# Patient Record
Sex: FEMALE | Race: BLACK OR AFRICAN AMERICAN | NOT HISPANIC OR LATINO | Employment: STUDENT | URBAN - METROPOLITAN AREA
[De-identification: names, ages, dates, MRNs, and addresses within clinical notes are randomized per-mention and may not be internally consistent; named-entity substitution may affect disease eponyms.]

---

## 2018-11-04 ENCOUNTER — OFFICE VISIT (OUTPATIENT)
Dept: URGENT CARE | Facility: CLINIC | Age: 18
End: 2018-11-04
Payer: COMMERCIAL

## 2018-11-04 VITALS
HEIGHT: 62 IN | BODY MASS INDEX: 25.4 KG/M2 | OXYGEN SATURATION: 98 % | TEMPERATURE: 98.2 F | SYSTOLIC BLOOD PRESSURE: 126 MMHG | RESPIRATION RATE: 16 BRPM | DIASTOLIC BLOOD PRESSURE: 76 MMHG | WEIGHT: 138 LBS | HEART RATE: 80 BPM

## 2018-11-04 DIAGNOSIS — H10.33 ACUTE BACTERIAL CONJUNCTIVITIS OF BOTH EYES: ICD-10-CM

## 2018-11-04 DIAGNOSIS — J01.90 ACUTE SINUSITIS, RECURRENCE NOT SPECIFIED, UNSPECIFIED LOCATION: Primary | ICD-10-CM

## 2018-11-04 PROCEDURE — 99203 OFFICE O/P NEW LOW 30 MIN: CPT | Performed by: PHYSICIAN ASSISTANT

## 2018-11-04 RX ORDER — AMOXICILLIN AND CLAVULANATE POTASSIUM 875; 125 MG/1; MG/1
1 TABLET, FILM COATED ORAL EVERY 12 HOURS SCHEDULED
Qty: 14 TABLET | Refills: 0 | Status: SHIPPED | OUTPATIENT
Start: 2018-11-04 | End: 2018-11-11

## 2018-11-04 RX ORDER — TOBRAMYCIN 3 MG/ML
2 SOLUTION/ DROPS OPHTHALMIC
Qty: 1 BOTTLE | Refills: 0 | Status: SHIPPED | OUTPATIENT
Start: 2018-11-04 | End: 2018-11-11

## 2018-11-04 RX ORDER — FLUTICASONE PROPIONATE 110 UG/1
2 AEROSOL, METERED RESPIRATORY (INHALATION) 2 TIMES DAILY
COMMUNITY

## 2018-11-04 RX ORDER — MONTELUKAST SODIUM 10 MG/1
10 TABLET ORAL
COMMUNITY

## 2018-11-04 NOTE — PROGRESS NOTES
[employer]        NAME: Natasha Naranjo is a 21 y o  female  : 2000    MRN: [id number]  DATE: 2018  TIME: 2:38 PM    Assessment and Plan   Acute sinusitis, recurrence not specified, unspecified location [J01 90]  1  Acute sinusitis, recurrence not specified, unspecified location  amoxicillin-clavulanate (AUGMENTIN) 875-125 mg per tablet   2  Acute bacterial conjunctivitis of both eyes  tobramycin (TOBREX) 0 3 % SOLN         Patient Instructions       Follow up with PCP in 3-5 days  Proceed to  ER if symptoms worsen  Chief Complaint     Chief Complaint   Patient presents with    Sore Throat     Fever on wednesday, swollen throat, hard to swollow, bad headache, chills, sweats,  b/l ears congested  Yesterday nasal congested   Eye Problem     Woke up this AM with b/l eye eyrthema, "gunk" crusting her eyes shut  History of Present Illness       Patient is here for evaluation of sinus congestion, pressure, headache, fever and achiness  Patient's symptoms started on Wednesday and have been getting progressively worse  Patient woke up this morning with bilateral redness and drainage from both eyes    She denies any excess tearing, double vision, blurred vision        Review of Systems   Review of Systems      Current Medications       Current Outpatient Prescriptions:     fexofenadine (ALLEGRA) 30 MG tablet, Take 30 mg by mouth 2 (two) times a day, Disp: , Rfl:     fluticasone (FLOVENT HFA) 110 MCG/ACT inhaler, Inhale 2 puffs 2 (two) times a day Rinse mouth after use , Disp: , Rfl:     montelukast (SINGULAIR) 10 mg tablet, Take 10 mg by mouth daily at bedtime, Disp: , Rfl:     amoxicillin-clavulanate (AUGMENTIN) 875-125 mg per tablet, Take 1 tablet by mouth every 12 (twelve) hours for 7 days, Disp: 14 tablet, Rfl: 0    tobramycin (TOBREX) 0 3 % SOLN, Administer 2 drops to both eyes every 4 (four) hours while awake for 7 days, Disp: 1 Bottle, Rfl: 0    Current Allergies     Allergies as of 11/04/2018    (No Known Allergies)            The following portions of the patient's history were reviewed and updated as appropriate: allergies, current medications, past family history, past medical history, past social history, past surgical history and problem list      No past medical history on file  No past surgical history on file  No family history on file  Medications have been verified  Objective   /76   Pulse 80   Temp 98 2 °F (36 8 °C)   Resp 16   Ht 5' 2" (1 575 m)   Wt 62 6 kg (138 lb)   SpO2 98%   BMI 25 24 kg/m²        Physical Exam     Physical Exam   Constitutional: She is oriented to person, place, and time  She appears well-developed and well-nourished  No distress  HENT:   Head: Normocephalic and atraumatic  Right Ear: External ear normal    Left Ear: External ear normal    TMs intact bilaterally with clear fluid in the middle ear  No erythema  Bilateral nasal congestion and erythema with mucopurulent drainage  Mild bilateral tonsillar erythema with no soft tissue swelling  No exudate  Eyes: Pupils are equal, round, and reactive to light  EOM are normal  Right eye exhibits discharge  Left eye exhibits no discharge  Bilateral conjunctivitis  Pulmonary/Chest: Effort normal and breath sounds normal  No respiratory distress  She has no wheezes  She has no rales  Lymphadenopathy:     She has cervical adenopathy  Neurological: She is alert and oriented to person, place, and time  Skin: Skin is warm and dry  Psychiatric: She has a normal mood and affect  Her behavior is normal    Nursing note and vitals reviewed

## 2019-08-13 ENCOUNTER — OFFICE VISIT (OUTPATIENT)
Dept: PEDIATRICS CLINIC | Facility: CLINIC | Age: 19
End: 2019-08-13
Payer: COMMERCIAL

## 2019-08-13 VITALS
HEART RATE: 80 BPM | HEIGHT: 62 IN | BODY MASS INDEX: 26.31 KG/M2 | WEIGHT: 143 LBS | SYSTOLIC BLOOD PRESSURE: 120 MMHG | DIASTOLIC BLOOD PRESSURE: 78 MMHG

## 2019-08-13 DIAGNOSIS — Z71.3 NUTRITIONAL COUNSELING: ICD-10-CM

## 2019-08-13 DIAGNOSIS — Z13.31 SCREENING FOR DEPRESSION: ICD-10-CM

## 2019-08-13 DIAGNOSIS — Z00.129 ENCOUNTER FOR ROUTINE CHILD HEALTH EXAMINATION WITHOUT ABNORMAL FINDINGS: Primary | ICD-10-CM

## 2019-08-13 DIAGNOSIS — Z71.82 EXERCISE COUNSELING: ICD-10-CM

## 2019-08-13 PROCEDURE — 99395 PREV VISIT EST AGE 18-39: CPT | Performed by: PEDIATRICS

## 2019-08-13 PROCEDURE — 1036F TOBACCO NON-USER: CPT | Performed by: PEDIATRICS

## 2019-08-13 PROCEDURE — 96127 BRIEF EMOTIONAL/BEHAV ASSMT: CPT | Performed by: PEDIATRICS

## 2019-08-13 PROCEDURE — 3008F BODY MASS INDEX DOCD: CPT | Performed by: PEDIATRICS

## 2019-08-13 NOTE — PROGRESS NOTES
Subjective:     Ye Keller is a 19 y o  female who is brought in for this well child visit  History provided by: patient    Current Issues:  Current concerns: none  regular periods, no issues    The following portions of the patient's history were reviewed and updated as appropriate: allergies, current medications, past family history, past medical history, past social history, past surgical history and problem list     Well Child Assessment:  History provided by: patient  Keshawn Mckeon lives with her mother, father, sister and brother (boyfriend in basement)  Nutrition  Types of intake include vegetables, fruits, meats, eggs and cow's milk (drinks water well, dances 4-6 hours daily)  Dental  The patient has a dental home  The patient brushes teeth regularly  The patient flosses regularly  Last dental exam was 6-12 months ago  Behavioral  Disciplinary methods include consistency among caregivers  Sleep  Average sleep duration is 8 hours  The patient does not snore  There are no sleep problems  Safety  There is no smoking in the home  Home has working smoke alarms? yes  Home has working carbon monoxide alarms? yes  There is no gun in home  School  Current school district is [place] for [employer]  There are no signs of learning disabilities  Child is doing well in school  Screening  There are no risk factors for hearing loss  There are no risk factors for anemia  There are no risk factors for dyslipidemia  There are no risk factors for tuberculosis  There are no risk factors for vision problems  There are no risk factors related to diet  There are no risk factors at school  There are no risk factors for sexually transmitted infections  There are risk factors related to alcohol (drinks with family)  There are no risk factors related to relationships  There are no risk factors related to friends or family  There are no risk factors related to emotions  There are no risk factors related to drugs   There are no risk factors related to personal safety  There are no risk factors related to tobacco  There are no risk factors related to special circumstances  Social  The caregiver enjoys the child  Sibling interactions are good  Objective:       Vitals:    08/13/19 1006   BP: 120/78   Pulse: 80   Weight: 64 9 kg (143 lb)   Height: 5' 1 5" (1 562 m)     Growth parameters are noted and are appropriate for age  Wt Readings from Last 1 Encounters:   08/13/19 64 9 kg (143 lb)     Ht Readings from Last 1 Encounters:   08/13/19 5' 1 5" (1 562 m)      Body mass index is 26 58 kg/m²  Vitals:    08/13/19 1006   BP: 120/78   Pulse: 80   Weight: 64 9 kg (143 lb)   Height: 5' 1 5" (1 562 m)       No exam data present    Physical Exam   Constitutional: She is oriented to person, place, and time  She appears well-developed and well-nourished  She is cooperative  HENT:   Head: Normocephalic  Right Ear: Hearing, tympanic membrane, external ear and ear canal normal    Left Ear: Hearing, tympanic membrane, external ear and ear canal normal    Nose: Nose normal    Mouth/Throat: Oropharynx is clear and moist and mucous membranes are normal    Eyes: Pupils are equal, round, and reactive to light  Conjunctivae, EOM and lids are normal    Neck: Trachea normal and normal range of motion  Neck supple  Cardiovascular: Normal rate, regular rhythm, normal heart sounds and normal pulses  No murmur heard  Pulmonary/Chest: Effort normal and breath sounds normal  She has no wheezes  She has no rhonchi  She has no rales  Abdominal: Soft  Bowel sounds are normal  She exhibits no mass  There is no hepatosplenomegaly  There is no tenderness  Genitourinary: Vagina normal    Genitourinary Comments: Normal Female  exam, tirso stage 5   Musculoskeletal: Normal range of motion  Neurological: She is alert and oriented to person, place, and time  She has normal strength  Skin: Skin is warm and dry   Capillary refill takes less than 2 seconds  No rash noted  Psychiatric: She has a normal mood and affect  Her behavior is normal  Judgment and thought content normal    Nursing note and vitals reviewed  Assessment:     Well adolescent  1  Screening for depression          Plan:         1  Anticipatory guidance discussed  Specific topics reviewed: breast self-exam, drugs, ETOH, and tobacco, minimize junk food, seat belts and sex; STD and pregnancy prevention  Nutrition and Exercise Counseling: The patient's Body mass index is 26 58 kg/m²  This is Facility age limit for growth percentiles is 20 years  Nutrition counseling provided:  Anticipatory guidance for nutrition given and counseled on healthy eating habits, 5 servings of fruits/vegetables and Avoid juice/sugary drinks    Exercise counseling provided:  Anticipatory guidance and counseling on exercise and physical activity given, Reduce screen time to less than 2 hours per day and 1 hour of aerobic exercise daily      2  Depression screen performed:       Patient screened- Negative    3  Development: appropriate for age    3  Immunizations today: Immunizations are up to date    11  Follow-up visit in 1 year for next well child visit, or sooner as needed

## 2019-08-13 NOTE — PATIENT INSTRUCTIONS

## 2020-07-14 ENCOUNTER — OFFICE VISIT (OUTPATIENT)
Dept: URGENT CARE | Facility: CLINIC | Age: 20
End: 2020-07-14
Payer: COMMERCIAL

## 2020-07-14 VITALS
TEMPERATURE: 96.2 F | HEIGHT: 63 IN | BODY MASS INDEX: 27 KG/M2 | OXYGEN SATURATION: 98 % | RESPIRATION RATE: 16 BRPM | HEART RATE: 95 BPM | SYSTOLIC BLOOD PRESSURE: 116 MMHG | WEIGHT: 152.4 LBS | DIASTOLIC BLOOD PRESSURE: 80 MMHG

## 2020-07-14 DIAGNOSIS — Z02.4 DRIVER'S PERMIT PE (PHYSICAL EXAMINATION): Primary | ICD-10-CM

## 2020-07-14 NOTE — PROGRESS NOTES
NAME: Pricila Robbins is a 22 y o  female  : 2000    MRN: [id number]      Assessment and Plan   's permit PE (physical examination) [Z02 4]  1  's permit PE (physical examination)     Patient is found to be mentally sound  Normal exam   's PE form signed and scanned  If any medical conditions presents follow-up with PCP  Patient voice understanding  Elizabeth Sanchez was seen today for annual exam     Diagnoses and all orders for this visit:    's permit PE (physical examination)        Patient Instructions   There are no Patient Instructions on file for this visit  Proceed to ER if symptoms worsen  Chief Complaint     Chief Complaint   Patient presents with    Annual Exam     drivers license         History of Present Illness     20yo Pt present for 's permit physical   Patient denies any medical history  Patient denies neurological disorders, homicidal thoughts, suicidal thoughts  Patient denies seizures, cardiac disorders, lung disease  Patient denies any amputations  Patient denies any history of drug abuse, alcohol abuse  Review of Systems   Review of Systems   Constitutional: Negative for chills, fatigue and fever  HENT: Negative for congestion, ear pain, postnasal drip, rhinorrhea, sinus pressure and sore throat  Respiratory: Negative for cough, chest tightness, shortness of breath and wheezing  Cardiovascular: Negative for chest pain and palpitations  Gastrointestinal: Negative for abdominal pain, constipation, diarrhea, nausea and vomiting  Musculoskeletal: Negative for back pain and neck pain  Skin: Negative  Neurological: Negative for dizziness, weakness, numbness and headaches  Hematological: Negative  Psychiatric/Behavioral: Negative            Current Medications       Current Outpatient Medications:     fexofenadine (ALLEGRA) 30 MG tablet, Take 30 mg by mouth 2 (two) times a day, Disp: , Rfl:     fluticasone (FLOVENT HFA) 110 MCG/ACT inhaler, Inhale 2 puffs 2 (two) times a day Rinse mouth after use , Disp: , Rfl:     montelukast (SINGULAIR) 10 mg tablet, Take 10 mg by mouth daily at bedtime, Disp: , Rfl:     Current Allergies     Allergies as of 07/14/2020    (No Known Allergies)              Past Medical History:   Diagnosis Date    Allergic     Asthma        History reviewed  No pertinent surgical history  No family history on file  Medications have been verified  The following portions of the patient's history were reviewed and updated as appropriate: allergies, current medications, past family history, past medical history, past social history, past surgical history and problem list     Objective   /80   Pulse 95   Temp (!) 96 2 °F (35 7 °C) (Tympanic)   Resp 16   Ht 5' 2 5" (1 588 m)   Wt 69 1 kg (152 lb 6 4 oz)   SpO2 98%   BMI 27 43 kg/m²      Physical Exam     Physical Exam   Constitutional: She is oriented to person, place, and time  She appears well-developed and well-nourished  No distress  HENT:   Head: Normocephalic and atraumatic  Right Ear: Hearing, tympanic membrane, external ear and ear canal normal    Left Ear: Hearing, tympanic membrane, external ear and ear canal normal    Mouth/Throat: Uvula is midline, oropharynx is clear and moist and mucous membranes are normal  No tonsillar exudate  Eyes: Pupils are equal, round, and reactive to light  Conjunctivae and EOM are normal  Right eye exhibits no discharge  Left eye exhibits no discharge  No scleral icterus  Neck: Normal range of motion  Neck supple  No tracheal deviation present  No thyromegaly present  Cardiovascular: Normal rate, regular rhythm and normal heart sounds  Exam reveals no gallop and no friction rub  No murmur heard  Pulmonary/Chest: Effort normal and breath sounds normal  No stridor  She has no wheezes  She has no rales  Abdominal: Soft  She exhibits no distension and no mass  There is no tenderness   There is no rebound and no guarding  No hernia  Musculoskeletal: Normal range of motion  She exhibits no edema, tenderness or deformity  Neurological: She is alert and oriented to person, place, and time  She displays normal reflexes  No cranial nerve deficit or sensory deficit  She exhibits normal muscle tone  Coordination normal    Skin: Skin is warm and dry  No rash noted  She is not diaphoretic  No erythema  No pallor  Psychiatric: She has a normal mood and affect  Her behavior is normal    Nursing note and vitals reviewed        Sravani Winchester PA-C

## 2021-02-12 ENCOUNTER — HOSPITAL ENCOUNTER (EMERGENCY)
Facility: HOSPITAL | Age: 21
Discharge: HOME | End: 2021-02-12
Attending: EMERGENCY MEDICINE | Admitting: EMERGENCY MEDICINE
Payer: COMMERCIAL

## 2021-02-12 VITALS
RESPIRATION RATE: 20 BRPM | DIASTOLIC BLOOD PRESSURE: 72 MMHG | TEMPERATURE: 97.7 F | SYSTOLIC BLOOD PRESSURE: 120 MMHG | HEART RATE: 92 BPM | OXYGEN SATURATION: 99 %

## 2021-02-12 DIAGNOSIS — Z20.822 CONTACT WITH AND (SUSPECTED) EXPOSURE TO COVID-19: ICD-10-CM

## 2021-02-12 DIAGNOSIS — R06.02 SOB (SHORTNESS OF BREATH): Primary | ICD-10-CM

## 2021-02-12 PROCEDURE — 99284 EMERGENCY DEPT VISIT MOD MDM: CPT

## 2021-02-12 PROCEDURE — 87635 SARS-COV-2 COVID-19 AMP PRB: CPT | Performed by: EMERGENCY MEDICINE

## 2021-02-12 PROCEDURE — 99284 EMERGENCY DEPT VISIT MOD MDM: CPT | Performed by: EMERGENCY MEDICINE

## 2021-02-13 LAB — SARS-COV-2 RNA RESP QL NAA+PROBE: POSITIVE

## 2021-02-13 NOTE — ED PROVIDER NOTES
HPI: Patient is a 23 y o  female who presents with 2 days of shortness of breath and anosmia which the patient describes at mild The patient has had contact with people with similar symptoms  The patient has not taken any medication  No Known Allergies    Past Medical History:   Diagnosis Date    Allergic     Asthma       No past surgical history on file  Social History     Tobacco Use    Smoking status: Never Smoker    Smokeless tobacco: Never Used   Substance Use Topics    Alcohol use: Yes     Alcohol/week: 1 0 standard drinks     Types: 1 Glasses of wine per week     Frequency: 2-4 times a month    Drug use: Never       Nursing notes reviewed  Physical Exam:  ED Triage Vitals   Temp Pulse Resp BP SpO2   -- -- -- -- --      Temp src Heart Rate Source Patient Position - Orthostatic VS BP Location FiO2 (%)   -- -- -- -- --      Pain Score       --           ROS: Positive for loss of smell and taste, shortness of breath, chest tightness, runny nose, the remainder of a 10 organ system ROS was otherwise unremarkable  General: awake, alert, no acute distress    Head: normocephalic, atraumatic    Eyes: no scleral icterus  Ears: external ears normal, hearing grossly intact  Nose: external exam grossly normal, positive nasal discharge  Neck: symmetric, No JVD noted, trachea midline  Pulmonary: no respiratory distress, no tachypnea noted  Cardiovascular: appears well perfused  Abdomen: no distention noted  Musculoskeletal: no deformities noted, tone normal  Neuro: grossly non-focal  Psych: mood and affect appropriate    The patient is stable and has a history and physical exam consistent with a viral illness  COVID19 testing has been performed  I considered the patient's other medical conditions as applicable/noted above in my medical decision making  The patient is stable upon discharge  The plan is for supportive care at home      The patient (and any family present) verbalized understanding of the discharge instructions and warnings that would necessitate return to the Emergency Department  All questions were answered prior to discharge  Medications - No data to display  Final diagnoses:   SOB (shortness of breath)   Contact with and (suspected) exposure to covid-19     Time reflects when diagnosis was documented in both MDM as applicable and the Disposition within this note     Time User Action Codes Description Comment    2/12/2021  9:31 PM Ember Marie [R06 02] SOB (shortness of breath)     2/12/2021  9:31 PM Ember Marie [Z20 822] Contact with and (suspected) exposure to covid-19       ED Disposition     ED Disposition Condition Date/Time Comment    Discharge Stable Fri Feb 12, 2021  9:31 PM Hafsa Gutierres discharge to home/self care  Follow-up Information    None       Patient's Medications   Discharge Prescriptions    No medications on file     No discharge procedures on file      Electronically Signed by       Jae Torres DO  02/12/21 [id number]

## 2021-02-13 NOTE — ED NOTES
Patient's ambulatory pulse ox was 98% on room air while walking from ED waiting room to ED room 04        Yosef Muhammad RN  02/12/21 [id number]